# Patient Record
Sex: MALE | Race: OTHER | Employment: FULL TIME | URBAN - METROPOLITAN AREA
[De-identification: names, ages, dates, MRNs, and addresses within clinical notes are randomized per-mention and may not be internally consistent; named-entity substitution may affect disease eponyms.]

---

## 2017-06-12 ENCOUNTER — APPOINTMENT (OUTPATIENT)
Dept: CT IMAGING | Age: 42
End: 2017-06-12
Attending: EMERGENCY MEDICINE
Payer: SUBSIDIZED

## 2017-06-12 ENCOUNTER — APPOINTMENT (OUTPATIENT)
Dept: GENERAL RADIOLOGY | Age: 42
End: 2017-06-12
Attending: EMERGENCY MEDICINE
Payer: SUBSIDIZED

## 2017-06-12 ENCOUNTER — HOSPITAL ENCOUNTER (EMERGENCY)
Age: 42
Discharge: HOME | End: 2017-06-13
Attending: EMERGENCY MEDICINE
Payer: SUBSIDIZED

## 2017-06-12 DIAGNOSIS — K29.90 GASTRITIS AND DUODENITIS: ICD-10-CM

## 2017-06-12 DIAGNOSIS — R10.13 ABDOMINAL PAIN, EPIGASTRIC: Primary | ICD-10-CM

## 2017-06-12 LAB
ALBUMIN SERPL BCP-MCNC: 3.9 G/DL (ref 3.4–5)
ALBUMIN/GLOB SERPL: 1.1 {RATIO} (ref 0.8–1.7)
ALP SERPL-CCNC: 76 U/L (ref 45–117)
ALT SERPL-CCNC: 28 U/L (ref 16–61)
ANION GAP BLD CALC-SCNC: 7 MMOL/L (ref 3–18)
APPEARANCE UR: CLEAR
AST SERPL W P-5'-P-CCNC: 14 U/L (ref 15–37)
BASOPHILS # BLD AUTO: 0 K/UL (ref 0–0.1)
BASOPHILS # BLD: 0 % (ref 0–2)
BILIRUB SERPL-MCNC: 0.8 MG/DL (ref 0.2–1)
BILIRUB UR QL: NEGATIVE
BUN SERPL-MCNC: 13 MG/DL (ref 7–18)
BUN/CREAT SERPL: 12 (ref 12–20)
CALCIUM SERPL-MCNC: 8.7 MG/DL (ref 8.5–10.1)
CHLORIDE SERPL-SCNC: 102 MMOL/L (ref 100–108)
CO2 SERPL-SCNC: 29 MMOL/L (ref 21–32)
COLOR UR: YELLOW
CREAT SERPL-MCNC: 1.06 MG/DL (ref 0.6–1.3)
DIFFERENTIAL METHOD BLD: ABNORMAL
EOSINOPHIL # BLD: 0.1 K/UL (ref 0–0.4)
EOSINOPHIL NFR BLD: 1 % (ref 0–5)
ERYTHROCYTE [DISTWIDTH] IN BLOOD BY AUTOMATED COUNT: 13.5 % (ref 11.6–14.5)
GLOBULIN SER CALC-MCNC: 3.7 G/DL (ref 2–4)
GLUCOSE SERPL-MCNC: 114 MG/DL (ref 74–99)
GLUCOSE UR STRIP.AUTO-MCNC: NEGATIVE MG/DL
HCT VFR BLD AUTO: 47.6 % (ref 36–48)
HGB BLD-MCNC: 16.6 G/DL (ref 13–16)
HGB UR QL STRIP: NEGATIVE
KETONES UR QL STRIP.AUTO: NEGATIVE MG/DL
LACTATE BLD-SCNC: 0.7 MMOL/L (ref 0.4–2)
LEUKOCYTE ESTERASE UR QL STRIP.AUTO: NEGATIVE
LIPASE SERPL-CCNC: 197 U/L (ref 73–393)
LYMPHOCYTES # BLD AUTO: 20 % (ref 21–52)
LYMPHOCYTES # BLD: 2.1 K/UL (ref 0.9–3.6)
MCH RBC QN AUTO: 30.6 PG (ref 24–34)
MCHC RBC AUTO-ENTMCNC: 34.9 G/DL (ref 31–37)
MCV RBC AUTO: 87.7 FL (ref 74–97)
MONOCYTES # BLD: 0.9 K/UL (ref 0.05–1.2)
MONOCYTES NFR BLD AUTO: 9 % (ref 3–10)
NEUTS SEG # BLD: 7.3 K/UL (ref 1.8–8)
NEUTS SEG NFR BLD AUTO: 70 % (ref 40–73)
NITRITE UR QL STRIP.AUTO: NEGATIVE
PH UR STRIP: 7 [PH] (ref 5–8)
PLATELET # BLD AUTO: 260 K/UL (ref 135–420)
PMV BLD AUTO: 9.4 FL (ref 9.2–11.8)
POTASSIUM SERPL-SCNC: 3.5 MMOL/L (ref 3.5–5.5)
PROT SERPL-MCNC: 7.6 G/DL (ref 6.4–8.2)
PROT UR STRIP-MCNC: NEGATIVE MG/DL
RBC # BLD AUTO: 5.43 M/UL (ref 4.7–5.5)
SODIUM SERPL-SCNC: 138 MMOL/L (ref 136–145)
SP GR UR REFRACTOMETRY: 1.02 (ref 1–1.03)
TSH SERPL DL<=0.05 MIU/L-ACNC: 1.99 UIU/ML (ref 0.36–3.74)
UROBILINOGEN UR QL STRIP.AUTO: 0.2 EU/DL (ref 0.2–1)
WBC # BLD AUTO: 10.4 K/UL (ref 4.6–13.2)

## 2017-06-12 PROCEDURE — 83690 ASSAY OF LIPASE: CPT | Performed by: EMERGENCY MEDICINE

## 2017-06-12 PROCEDURE — 85025 COMPLETE CBC W/AUTO DIFF WBC: CPT | Performed by: EMERGENCY MEDICINE

## 2017-06-12 PROCEDURE — 96374 THER/PROPH/DIAG INJ IV PUSH: CPT

## 2017-06-12 PROCEDURE — 80053 COMPREHEN METABOLIC PANEL: CPT | Performed by: EMERGENCY MEDICINE

## 2017-06-12 PROCEDURE — 81003 URINALYSIS AUTO W/O SCOPE: CPT | Performed by: EMERGENCY MEDICINE

## 2017-06-12 PROCEDURE — 74011636320 HC RX REV CODE- 636/320: Performed by: EMERGENCY MEDICINE

## 2017-06-12 PROCEDURE — 74011250636 HC RX REV CODE- 250/636: Performed by: EMERGENCY MEDICINE

## 2017-06-12 PROCEDURE — 83605 ASSAY OF LACTIC ACID: CPT

## 2017-06-12 PROCEDURE — 84443 ASSAY THYROID STIM HORMONE: CPT | Performed by: EMERGENCY MEDICINE

## 2017-06-12 PROCEDURE — 74177 CT ABD & PELVIS W/CONTRAST: CPT

## 2017-06-12 PROCEDURE — 74020 XR ABD (AP AND ERECT OR DECUB): CPT

## 2017-06-12 PROCEDURE — 74011250637 HC RX REV CODE- 250/637: Performed by: EMERGENCY MEDICINE

## 2017-06-12 PROCEDURE — 99284 EMERGENCY DEPT VISIT MOD MDM: CPT

## 2017-06-12 RX ORDER — OMEPRAZOLE 20 MG/1
20 CAPSULE, DELAYED RELEASE ORAL DAILY
Qty: 14 CAP | Refills: 0 | Status: SHIPPED | OUTPATIENT
Start: 2017-06-12 | End: 2017-06-26

## 2017-06-12 RX ORDER — ONDANSETRON 2 MG/ML
4 INJECTION INTRAMUSCULAR; INTRAVENOUS
Status: COMPLETED | OUTPATIENT
Start: 2017-06-12 | End: 2017-06-12

## 2017-06-12 RX ORDER — PANTOPRAZOLE SODIUM 40 MG/1
40 TABLET, DELAYED RELEASE ORAL
Status: COMPLETED | OUTPATIENT
Start: 2017-06-12 | End: 2017-06-12

## 2017-06-12 RX ORDER — ONDANSETRON 8 MG/1
8 TABLET, ORALLY DISINTEGRATING ORAL
Status: DISCONTINUED | OUTPATIENT
Start: 2017-06-12 | End: 2017-06-13 | Stop reason: HOSPADM

## 2017-06-12 RX ADMIN — ONDANSETRON 4 MG: 2 INJECTION INTRAMUSCULAR; INTRAVENOUS at 20:07

## 2017-06-12 RX ADMIN — ONDANSETRON 8 MG: 8 TABLET, ORALLY DISINTEGRATING ORAL at 21:33

## 2017-06-12 RX ADMIN — IOPAMIDOL 100 ML: 612 INJECTION, SOLUTION INTRAVENOUS at 22:10

## 2017-06-12 RX ADMIN — PANTOPRAZOLE SODIUM 40 MG: 40 TABLET, DELAYED RELEASE ORAL at 21:33

## 2017-06-12 NOTE — LETTER
NOTIFICATION RETURN TO WORK / SCHOOL 
 
6/12/2017 11:09 PM 
 
Mr. Annamarie Gamez [address] [place], [place] [id number] To Whom It May Concern: 
 
Annamarie Gamez is currently under the care of [facility] EMERGENCY DEPT. He will return to work/school on: 06/14/17 If there are questions or concerns please have the patient contact our office. Sincerely, Angel Emerson MD

## 2017-06-12 NOTE — ED NOTES
Pt sitting on side of bed in comfortable position. Pt able to ambulate to restroom to provide urine sample. States that he has had N/V x days. PT states he is only able to eat small amount and it causes abdominal pain. States that he went to work today and wasn't able to continue working because of pains and increased sweating. Will continue to monitor.

## 2017-06-12 NOTE — ED NOTES
I performed a brief evaluation, including history and physical, of the patient here in triage and I have determined that pt will need further treatment and evaluation from the FT provider. I have placed initial orders to help in expediting patients care.      June 12, 2017 at 6:53 PM - Erick Kaba MD        Visit Vitals    /83 (BP 1 Location: Left arm, BP Patient Position: At rest)    Pulse 77    Temp 98.8 °F (37.1 °C)    Resp 19    Wt 75 kg (165 lb 5.5 oz)    SpO2 97%

## 2017-06-13 VITALS
SYSTOLIC BLOOD PRESSURE: 123 MMHG | RESPIRATION RATE: 16 BRPM | WEIGHT: 165.34 LBS | DIASTOLIC BLOOD PRESSURE: 80 MMHG | OXYGEN SATURATION: 98 % | TEMPERATURE: 98.7 F | HEART RATE: 73 BPM

## 2017-06-13 NOTE — ED NOTES
I have reviewed discharge instructions with the patient. The patient verbalized understanding. Patient looks comfortable, left ED in stable condition. Patient ambulatory, steady gait noted. Vital signs stable upon discharge. No acute distress noted, no new complaints noted at this time. Patient armband removed and given to patient to take home. Patient was informed of the privacy risks if armband lost or stolen.

## 2017-06-13 NOTE — DISCHARGE INSTRUCTIONS
Dolor abdominal: Instrucciones de cuidado - [ Abdominal Pain: Care Instructions ]  Instrucciones de cuidado    El dolor abdominal tiene muchas causas posibles. Algunas de ellas no son graves y mejoran por sí solas en unos días. Otras requieren [place] y [place]. Si abarca dolor continúa o [place], necesitará el nueva revisión y [place] pruebas para determinar qué pasa. Es posible que necesite cirugía para corregir el problema. No ignore nuevos síntomas, garret fiebre, náuseas y [place], [address] [facility], dolor que [place] o [place]. Podrían ser señales de un problema más grave. Abarca médico puede haberle recomendado el consulta de [employer] las 8 o 12 horas siguientes. Si no se siente mejor, es posible que requiera [place] o [place]. El médico lo donaldson revisado minuciosamente, anitra puede emerald problemas más tarde. Si nota algún problema o síntomas nuevos, busque tratamiento médico inmediatamente. La atención de seguimiento es el parte clave de abarca tratamiento y seguridad. Asegúrese de hacer y acudir a todas las citas, y llame a abarca médico si está teniendo problemas. También es el buena idea saber los resultados de los exámenes y mantener el lista de los medicamentos que peter. ¿Cómo puede cuidarse en el hogar? · Descanse hasta que se sienta mejor. · Para prevenir la deshidratación, nya abundantes líquidos, suficientes para que abarca orina sea de color amarillo mg o transparente garret el agua. Elija beber agua y otros líquidos daisha sin cafeína hasta que se sienta mejor. Si tiene [employer], del corazón o del hígado y tiene que Tricia's líquidos, hable con abarca médico antes de aumentar abarca consumo. · Si tiene [employer], coma alimentos suaves, garret arroz, pan jayson seco o galletas saladas, bananas (plátanos) y puré de Synchari. Trate de comer varias comidas pequeñas al día en lugar de dos o arvind grandes.   · Espere hasta 50 horas después de que [employer] síntomas hayan desaparecido antes de comer alimentos condimentados, alcohol y bebidas que contengan cafeína. · No consuma alimentos ricos en grasa. · Evite medicamentos antiinflamatorios garret aspirina, ibuprofeno (Advil, Motrin) y naproxeno (Aleve). Pueden causar [employer]. Dígale a abarca médico si está tomando aspirina diariamente debido a otro problema de david. ¿Cuándo debe pedir ayuda? Llame al 911 en cualquier momento que considere que necesita atención de emergencia. Por ejemplo, llame si:  · Se desmayó (perdió el conocimiento). · Las heces son de color rojizo o muy sanguinolentas (con lottie). · Vomita lottie o algo parecido a granos de café molido. · Tiene dolor abdominal nuevo e intenso. Llame a abarca médico ahora mismo o busque atención médica inmediata si:  · Abarca dolor empeora, sobre todo si se concentra en el tomi parte del vientre. · Vuelve a tener fiebre o tiene fiebre más marco. · Vera heces son negruzcas y parecidas al alquitrán o tienen rastros de [place]. · Tiene sangrado vaginal inesperado. · Tiene síntomas de le infección del tracto urinario. Estos podrían incluir:  ¨ Dolor al [place]. ¨ Orinar con más frecuencia que lo habitual.  ¨ Lottie en la [place]. · Siente mareos o aturdimiento, o que está a punto de [place]. Preste especial atención a los cambios en abarca david y asegúrese de comunicarse con abarca médico si:  · No está mejorando después de 1 día (24 horas). ¿Dónde puede encontrar más información en inglés? Dilip Haq a http://diandra-cristo.info/. Jilda Burkitt D394 en la búsqueda para aprender más acerca de \"Dolor abdominal: Instrucciones de cuidado - [ Abdominal Pain: Care Instructions ]. \"  Revisado: 27 [place], 2016  Versión del contenido: 11.2  © 9478-9360 [employer], [employer]. Las instrucciones de cuidado fueron adaptadas bajo licencia por [employer] (which disclaims liability or warranty for this information).  Si usted tiene [place] afección médica o sobre estas instrucciones, siempre pregunte a abarca profesional de david. [facility] niega toda garantía o responsabilidad por abarca uso de esta información. Gastritis: Instrucciones de cuidado - [ Gastritis: Care Instructions ]  Instrucciones de cuidado    La gastritis es dolor y malestar estomacal. Sucede cuando algo irrita el revestimiento del estómago. Hay muchas cosas que pueden causarla. Entre estas se incluyen el infección garret la gripe o algo que ha comido o bebido. Los medicamentos o el llaga en el recubrimiento del estómago ([place]) también pueden causarla. Puede tener abotagamiento y dolor abdominal. Podría eructar, vomitar y tener revoltura estomacal.  Usted debería poder aliviar el problema tomando medicamentos. Y riaz vez sería útil cambiar la alimentación. Si la gastritis continúa, abarca médico podría recetarle medicamentos. La atención de seguimiento es el parte clave de abarca tratamiento y seguridad. Asegúrese de hacer y acudir a todas las citas, y llame a abarca médico si está teniendo problemas. También es el buena idea saber los resultados de los exámenes y mantener el lista de los medicamentos que peter. ¿Cómo puede cuidarse en el hogar? · Si abarca médico le recetó antibióticos, tómelos según las indicaciones. No deje de tomarlos por el hecho de sentirse mejor. Debe zhang todos los antibióticos hasta terminarlos. · Sea swati con los medicamentos. Si abarca médico le recetó un medicamento para reducir el ácido estomacal, tómelo según las indicaciones. Llame a abarca médico si mahsa estar teniendo problemas con abarca medicamento. · No tome ningún otro medicamento, incluyendo analgésicos (medicamentos para el dolor) de venta bel, sin consultar con abarca médico michael. · Si abarca médico le recomienda medicamentos de venta bel para reducir el ácido estomacal, tales garret Pepcid AC, Prilosec, Tagamet HB o Zantac 75, siga las instrucciones de la etiqueta.   · Pascale abundantes líquidos (los suficientes garret para que abarca orina sea de color amarillo mg o transparente garret el agua) para prevenir la deshidratación. Elija zhang agua y otros líquidos daisha sin cafeína. Si tiene [employer] [facility], el corazón o el hígado y tiene que Tricia's líquidos, hable con abarca médico antes de aumentar abarca consumo. · Limite la cantidad de alcohol que doretha. · Evite el café, el té, las bebidas de cola, el chocolate y otros alimentos que contengan cafeína. Aumentan el ácido estomacal.  ¿Cuándo debe pedir ayuda? Llame al 911 en cualquier momento que considere que necesita atención de [place]. Por ejemplo, llame si:  · Vomita lottie o algo parecido a granos de café molido. · Vera heces son de color rojizo o muy sanguinolentas (con lottie). Llame a abarca médico ahora mismo o busque atención médica inmediata si:  · Empieza a respirar en forma acelerada y no ha producido [place] en las últimas 8 horas. · No puede retener líquidos en el estómago. Preste especial atención a los cambios en abarca david y asegúrese de comunicarse con abarca médico si:  · No mejora garret se esperaba. ¿Dónde puede encontrar más información en inglés? Emerson Beltrán a http://diandra-cristo.info/. Yolanda Toure Q639 en la búsqueda para aprender más acerca de \"Gastritis: Instrucciones de cuidado - [ Gastritis: Care Instructions ]. \"  Revisado: 9 agosto, 2016  Versión del contenido: 11.2  © 9259-2240 [employer]. Las instrucciones de cuidado fueron adaptadas bajo licencia por [employer] (which disclaims liability or warranty for this information). Si usted tiene [place] afección médica o sobre estas instrucciones, siempre pregunte a abarca profesional de david. [facility] niega toda garantía o responsabilidad por abarca uso de esta información.

## 2017-06-13 NOTE — ED PROVIDER NOTES
HPI Comments: 8:44 PM Tristin Hutchinson is a 46 y.o. male who presents to the ED c/o epigastric, LLQ, and LUQ abdominal pain X 11 PM on 06/09/17. Pt states that he ate some fajitas from a [employer] the night his abdomen began to hurt. He mentions that no one else was sick and he does not think that the food aggravated his abdomen. He states that he tried to go to work the next day and he became extremely nauseous. He has been unable to work for the past 3 days. He also c/o diaphoresis, subjective fever, and decrease appetite change. He states that he was been unable to eat broth or soup since last Friday. He has tried taking laxatives and Peptobismol, but they have provided minimal relief. He denies diarrhea, difficulty urinating, any abdominal surgeries, and BM complications. No other associated symptoms or modifying factors at this time. The history is provided by the patient. History reviewed. No pertinent past medical history. History reviewed. No pertinent surgical history. History reviewed. No pertinent family history. Social History     Social History    Marital status: SINGLE     Spouse name: N/A    Number of children: N/A    Years of education: N/A     Occupational History    Not on file. Social History Main Topics    Smoking status: Current Every Day Smoker    Smokeless tobacco: Not on file    Alcohol use Yes    Drug use: No    Sexual activity: Not on file     Other Topics Concern    Not on file     Social History Narrative    No narrative on file         ALLERGIES: Review of patient's allergies indicates no known allergies. Review of Systems   Constitutional: Positive for appetite change (decreased), diaphoresis and fever (subjective). Negative for activity change. HENT: Negative for congestion, dental problem, ear pain, hearing loss, nosebleeds, postnasal drip, sinus pressure, sneezing and tinnitus.     Eyes: Negative for photophobia, discharge, redness and visual disturbance. Respiratory: Negative for choking, shortness of breath, wheezing and stridor. Cardiovascular: Negative for palpitations and leg swelling. Gastrointestinal: Positive for nausea. Negative for abdominal distention, anal bleeding, blood in stool and diarrhea. Genitourinary: Negative for decreased urine volume, difficulty urinating, discharge, penile swelling, scrotal swelling and urgency. Musculoskeletal: Negative for gait problem, joint swelling and neck pain. Skin: Negative for color change and pallor. Neurological: Negative for dizziness, tremors, seizures and syncope. Hematological: Negative for adenopathy. Does not bruise/bleed easily. Psychiatric/Behavioral: Negative for agitation, behavioral problems, confusion and hallucinations. The patient is not nervous/anxious. All other systems reviewed and are negative. Vitals:    06/12/17 1848   BP: 125/83   Pulse: 77   Resp: 19   Temp: 98.8 °F (37.1 °C)   SpO2: 97%   Weight: 75 kg (165 lb 5.5 oz)            Physical Exam   Constitutional: He is oriented to person, place, and time. He appears well-developed and well-nourished. HENT:   Head: Normocephalic and atraumatic. Right Ear: External ear normal.   Left Ear: External ear normal.   Nose: Nose normal.   Mouth/Throat: Oropharynx is clear and moist.   Eyes: Conjunctivae and EOM are normal. Pupils are equal, round, and reactive to light. Right eye exhibits no discharge. No scleral icterus. Neck: Normal range of motion. Neck supple. No JVD present. No thyromegaly present. Cardiovascular: Normal rate, regular rhythm and intact distal pulses. Exam reveals no gallop and no friction rub. No murmur heard. Pulmonary/Chest: No respiratory distress. He has no wheezes. He has no rales. He exhibits no tenderness. Abdominal: He exhibits no distension and no mass. There is tenderness in the epigastric area, left upper quadrant and left lower quadrant.  There is no rebound and no guarding. Genitourinary: Rectal exam shows guaiac negative stool. Genitourinary Comments: Brown stools noted   Musculoskeletal: Normal range of motion. He exhibits no edema. Lymphadenopathy:     He has no cervical adenopathy. Neurological: He is alert and oriented to person, place, and time. No cranial nerve deficit. Coordination normal.   Skin: Skin is warm and dry. No rash noted. No erythema. Psychiatric: He has a normal mood and affect. His behavior is normal. Judgment normal.   Nursing note and vitals reviewed. MDM  Number of Diagnoses or Management Options  Diagnosis management comments: Abdominal pain, epigastric. Differential diagnosis includes peptic ulcer disease, pancreatitis, hepatic disease.  Aortic disease, medication effect, other       Amount and/or Complexity of Data Reviewed  Clinical lab tests: ordered  Tests in the radiology section of CPT®: ordered    Risk of Complications, Morbidity, and/or Mortality  Presenting problems: moderate      ED Course       Procedures  Vitals:  Patient Vitals for the past 12 hrs:   Temp Pulse Resp BP SpO2   06/12/17 1848 98.8 °F (37.1 °C) 77 19 125/83 97 %       Medications ordered:   Medications   ondansetron (ZOFRAN ODT) tablet 8 mg (8 mg Oral Given 6/12/17 2133)   ondansetron (ZOFRAN) injection 4 mg (4 mg IntraVENous Given 6/12/17 2007)   pantoprazole (PROTONIX) tablet 40 mg (40 mg Oral Given 6/12/17 2133)   iopamidol (ISOVUE 300) 61 % contrast injection 100 mL (100 mL IntraVENous Given 6/12/17 2210)         Lab findings:  Recent Results (from the past 12 hour(s))   LIPASE    Collection Time: 06/12/17  7:52 PM   Result Value Ref Range    Lipase 197 73 - 393 U/L   URINALYSIS W/ RFLX MICROSCOPIC    Collection Time: 06/12/17  7:52 PM   Result Value Ref Range    Color YELLOW      Appearance CLEAR      Specific gravity 1.018 1.005 - 1.030      pH (UA) 7.0 5.0 - 8.0      Protein NEGATIVE  NEG mg/dL    Glucose NEGATIVE  NEG mg/dL    Ketone NEGATIVE  NEG mg/dL    Bilirubin NEGATIVE  NEG      Blood NEGATIVE  NEG      Urobilinogen 0.2 0.2 - 1.0 EU/dL    Nitrites NEGATIVE  NEG      Leukocyte Esterase NEGATIVE  NEG     CBC WITH AUTOMATED DIFF    Collection Time: 06/12/17  7:52 PM   Result Value Ref Range    WBC 10.4 4.6 - 13.2 K/uL    RBC 5.43 4.70 - 5.50 M/uL    HGB 16.6 (H) 13.0 - 16.0 g/dL    HCT 47.6 36.0 - 48.0 %    MCV 87.7 74.0 - 97.0 FL    MCH 30.6 24.0 - 34.0 PG    MCHC 34.9 31.0 - 37.0 g/dL    RDW 13.5 11.6 - 14.5 %    PLATELET 742 184 - 906 K/uL    MPV 9.4 9.2 - 11.8 FL    NEUTROPHILS 70 40 - 73 %    LYMPHOCYTES 20 (L) 21 - 52 %    MONOCYTES 9 3 - 10 %    EOSINOPHILS 1 0 - 5 %    BASOPHILS 0 0 - 2 %    ABS. NEUTROPHILS 7.3 1.8 - 8.0 K/UL    ABS. LYMPHOCYTES 2.1 0.9 - 3.6 K/UL    ABS. MONOCYTES 0.9 0.05 - 1.2 K/UL    ABS. EOSINOPHILS 0.1 0.0 - 0.4 K/UL    ABS. BASOPHILS 0.0 0.0 - 0.1 K/UL    DF AUTOMATED     METABOLIC PANEL, COMPREHENSIVE    Collection Time: 06/12/17  7:52 PM   Result Value Ref Range    Sodium 138 136 - 145 mmol/L    Potassium 3.5 3.5 - 5.5 mmol/L    Chloride 102 100 - 108 mmol/L    CO2 29 21 - 32 mmol/L    Anion gap 7 3.0 - 18 mmol/L    Glucose 114 (H) 74 - 99 mg/dL    BUN 13 7.0 - 18 MG/DL    Creatinine 1.06 0.6 - 1.3 MG/DL    BUN/Creatinine ratio 12 12 - 20      GFR est AA >60 >60 ml/min/1.73m2    GFR est non-AA >60 >60 ml/min/1.73m2    Calcium 8.7 8.5 - 10.1 MG/DL    Bilirubin, total 0.8 0.2 - 1.0 MG/DL    ALT (SGPT) 28 16 - 61 U/L    AST (SGOT) 14 (L) 15 - 37 U/L    Alk.  phosphatase 76 45 - 117 U/L    Protein, total 7.6 6.4 - 8.2 g/dL    Albumin 3.9 3.4 - 5.0 g/dL    Globulin 3.7 2.0 - 4.0 g/dL    A-G Ratio 1.1 0.8 - 1.7     TSH 3RD GENERATION    Collection Time: 06/12/17  7:52 PM   Result Value Ref Range    TSH 1.99 0.36 - 3.74 uIU/mL   POC LACTIC ACID    Collection Time: 06/12/17  8:10 PM   Result Value Ref Range    Lactic Acid (POC) 0.7 0.4 - 2.0 mmol/L       X-Ray, CT or other radiology findings or impressions:  XR ABD (AP AND ERECT OR DECUB)      IMPRESSION:  Stool noted. Per Carin Bloch, MD 9:04 PM    IMPRESSION:  1. Nonobstructive abdomen. 2. Contrast opacification of some contents in the colon. Per Radiology      CT ABD PELV W CONT  IMPRESSION:  Nonspecific fluid within the duodenal C-loop. This may be within physiologic  limits of normal. In the setting of epigastric pain, finding may represent a  mild duodenitis.     No other definite finding for an acute process in the abdomen or pelvis.     Short segment narrowing of the rectosigmoid colon likely contraction, stricture  felt less likely. Not optimally evaluated due to underdistention.     Probable small cyst at the dome of the liver and the inferior margin of the  spleen. Reevaluation of patient:   9:55 PM Patient states that he is still having significant epigastric discomfort. Will order CT scan. 11:10 PM I have reassessed the patient. Patient is feeling okay. Patient was discharged. He was instructed to take prescribed medication and to follow a clear liquid diet for the next 24 hours. While it is impossible to completely exclude the possibility of underlying serious disease or worsening of condition, I feel the relative likelihood is extremely low. I discussed this uncertainty with the patient, who understood ED evaluation and treatment and felt comfortable with the outpatient treatment plan. All questions regarding care, test results, and follow up were answered. The patient is stable and appropriate to discharge. They understand that they should return to the emergency department for any new or worsening symptoms. I stressed the importance of follow up for repeat assessment and possibly further evaluation/treatment. Disposition:  Diagnosis:   1. Abdominal pain, epigastric    2.  Gastritis and duodenitis        Disposition: Discharged    Follow-up Information     None           Patient's Medications   Start Taking    OMEPRAZOLE (PRILOSEC) 20 MG CAPSULE    Take 1 Cap by mouth daily for 14 days. Continue Taking    No medications on file   These Medications have changed    No medications on file   Stop Taking    No medications on file       SCRIBE ATTESTATION STATEMENT  Documented by: Mirza Lee for, and in the presence of, Angel Emerson MD 8:42 PM    Signed by: Mauro Wheeler, 06/12/17 8:42 PM    PROVIDER ATTESTATION STATEMENT  I personally performed the services described in the documentation, reviewed the documentation, as recorded by the scribe in my presence, and it accurately and completely records my words and actions.   Angel Emerson MD

## 2017-06-19 ENCOUNTER — HOSPITAL ENCOUNTER (EMERGENCY)
Age: 42
Discharge: HOME | End: 2017-06-19
Attending: EMERGENCY MEDICINE
Payer: OTHER MISCELLANEOUS

## 2017-06-19 VITALS
RESPIRATION RATE: 16 BRPM | DIASTOLIC BLOOD PRESSURE: 70 MMHG | HEIGHT: 68 IN | BODY MASS INDEX: 26.37 KG/M2 | TEMPERATURE: 98.5 F | HEART RATE: 77 BPM | WEIGHT: 174 LBS | SYSTOLIC BLOOD PRESSURE: 118 MMHG | OXYGEN SATURATION: 99 %

## 2017-06-19 DIAGNOSIS — S81.811A LACERATION OF LEG, RIGHT, INITIAL ENCOUNTER: Primary | ICD-10-CM

## 2017-06-19 PROCEDURE — 74011250636 HC RX REV CODE- 250/636: Performed by: PHYSICIAN ASSISTANT

## 2017-06-19 PROCEDURE — 77030018836 HC SOL IRR NACL ICUM -A

## 2017-06-19 PROCEDURE — 77030031132 HC SUT NYL COVD -A

## 2017-06-19 PROCEDURE — 99282 EMERGENCY DEPT VISIT SF MDM: CPT

## 2017-06-19 PROCEDURE — 75810000293 HC SIMP/SUPERF WND  RPR

## 2017-06-19 PROCEDURE — 90471 IMMUNIZATION ADMIN: CPT

## 2017-06-19 PROCEDURE — 90715 TDAP VACCINE 7 YRS/> IM: CPT | Performed by: PHYSICIAN ASSISTANT

## 2017-06-19 RX ORDER — BACITRACIN 500 [USP'U]/G
OINTMENT TOPICAL 3 TIMES DAILY
Qty: 1 TUBE | Refills: 0 | Status: SHIPPED | OUTPATIENT
Start: 2017-06-19 | End: 2017-06-29

## 2017-06-19 RX ADMIN — TETANUS TOXOID, REDUCED DIPHTHERIA TOXOID AND ACELLULAR PERTUSSIS VACCINE, ADSORBED 0.5 ML: 5; 2.5; 8; 8; 2.5 SUSPENSION INTRAMUSCULAR at 02:53

## 2017-06-19 NOTE — ED PROVIDER NOTES
HPI Comments: 46 yr old male, no known PMH, presents to the ED after sustaining a laceration to the side of the R leg PTA. Pt states he cut the leg on a glass bottle. Unknown last tetanus. No other complaints. Patient is a 46 y.o. male presenting with skin laceration. Laceration    Pertinent negatives include no numbness and no weakness. History reviewed. No pertinent past medical history. History reviewed. No pertinent surgical history. History reviewed. No pertinent family history. Social History     Social History    Marital status: SINGLE     Spouse name: N/A    Number of children: N/A    Years of education: N/A     Occupational History    Not on file. Social History Main Topics    Smoking status: Current Every Day Smoker    Smokeless tobacco: Not on file    Alcohol use Yes    Drug use: No    Sexual activity: Not on file     Other Topics Concern    Not on file     Social History Narrative         ALLERGIES: Review of patient's allergies indicates no known allergies. Review of Systems   Constitutional: Negative. Negative for chills, diaphoresis, fatigue and fever. HENT: Negative. Negative for congestion, ear pain, rhinorrhea and sore throat. Eyes: Negative. Negative for pain, redness and visual disturbance. Respiratory: Negative. Negative for cough, shortness of breath, wheezing and stridor. Cardiovascular: Negative. Negative for chest pain, palpitations and leg swelling. Gastrointestinal: Negative. Negative for abdominal pain, constipation, diarrhea, nausea and vomiting. Endocrine: Negative. Genitourinary: Negative. Negative for dysuria, flank pain, frequency and hematuria. Musculoskeletal: Negative. Negative for back pain, myalgias, neck pain and neck stiffness. Skin: Positive for wound. Negative for rash. Allergic/Immunologic: Negative. Neurological: Negative.   Negative for dizziness, seizures, syncope, weakness, light-headedness, numbness and headaches. Hematological: Negative. Psychiatric/Behavioral: Negative. All other systems reviewed and are negative. Vitals:    06/19/17 0122   BP: 120/69   Pulse: 94   Resp: 18   Temp: 98.6 °F (37 °C)   SpO2: 98%   Weight: 78.9 kg (174 lb)   Height: 5' 8\" (1.727 m)            Physical Exam   Constitutional: He is oriented to person, place, and time. He appears well-developed and well-nourished. No distress. HENT:   Head: Normocephalic. Neck: Normal range of motion. Neck supple. Cardiovascular: Normal rate, regular rhythm and normal heart sounds. Exam reveals no gallop and no friction rub. No murmur heard. Pulmonary/Chest: Effort normal and breath sounds normal. No stridor. No respiratory distress. He has no wheezes. He has no rales. Musculoskeletal: Normal range of motion. He exhibits no edema, tenderness or deformity. Neurological: He is alert and oriented to person, place, and time. Coordination normal.   Skin: Skin is warm and dry. No rash noted. He is not diaphoretic. 3 cm laceration into the dermis noted to the lateral aspect of the R calf, bleeding controlled   Psychiatric: He has a normal mood and affect. His behavior is normal. Thought content normal.   Nursing note and vitals reviewed. MDM  Number of Diagnoses or Management Options  Diagnosis management comments: Impression:  Laceration    Tetanus given, wound sutured    Patient is stable for discharge at this time. Rx for bacitracin given. Rest and follow-up with PCP this week. Return to the ED immediately for any new or worsening sx.   Jacinda Barrett PA-C 2:45 AM     Risk of Complications, Morbidity, and/or Mortality  Presenting problems: low  Diagnostic procedures: low  Management options: low    Patient Progress  Patient progress: stable    ED Course       Wound Repair  Date/Time: 6/19/2017 2:45 AM  Performed by: PAPreparation: skin prepped with Betadine and sterile field established  Pre-procedure re-eval: Immediately prior to the procedure, the patient was reevaluated and found suitable for the planned procedure and any planned medications. Time out: Immediately prior to the procedure a time out was called to verify the correct patient, procedure, equipment, staff and marking as appropriate. .  Location details: right leg  Wound length:2.6 - 7.5 cm  Anesthesia: local infiltration    Anesthesia:  Anesthesia: local infiltration  Local Anesthetic: lidocaine 1% without epinephrine   Anesthetic total: 4 mL  Foreign bodies: no foreign bodies  Irrigation solution: saline  Irrigation method: syringe  Debridement: none  Skin closure: 5-0 nylon  Number of sutures: 7  Technique: simple  Approximation: close  Patient tolerance: Patient tolerated the procedure well with no immediate complications  My total time at bedside, performing this procedure was 1-15 minutes.

## 2017-06-19 NOTE — DISCHARGE INSTRUCTIONS
Laws: Instrucciones de cuidado - [ Cuts: Care Instructions ]  Instrucciones de cuidado  Un phil puede ocurrir en cualquier parte del cuerpo. A veces, se usan puntos de sutura, grapas, Adar IT para la piel o cintas [place] [address] llamadas Steri-Strips para mantener juntos los bordes de un phil y ayudar a que sane. Las cintas Steri-Strip pueden usarse por sí solas o junto con puntos de sutura o grapas. Otras veces, se kylee los laws abiertos. Si el phil es profundo y [employer], es posible que el médico haya cerrado el phil en dos capas. El capa más profunda de puntos de sutura junta la parte profunda del phil. Estos puntos se disuelven y no es necesario extraerlos. El cierre de la capa superior, que puede [place] por medio de puntos, Lazaro, Steri-Strips o Adar IT, es lo que se puede miguel angel sobre el phil. Con frecuencia, un phil se cubre con el venda. El médico lo ha examinado minuciosamente, anitra pueden presentarse problemas más tarde. Si nota algún problema o nuevos síntomas, busque tratamiento médico de inmediato. La atención de seguimiento es el parte clave de hidalgo tratamiento y seguridad. Asegúrese de hacer y acudir a todas las citas, y llame a hidalgo médico si está teniendo problemas. También es el buena idea saber los resultados de rodrigue exámenes y mantener el lista de los medicamentos que peter. ¿Cómo puede cuidarse en el hogar? Si un phil está abierto o cerrado  · Apoye la john afectada sobre el almohada en cualquier momento que esté sentado o acostado beth los 3 días siguientes. Trate de mantenerla por encima del nivel del corazón. [place] ayudará a reducir la hinchazón. · Mantenga la herida seca beth las primeras 24 a 48 horas. Después de [address], puede ducharse si el médico lo [place]. Seque el phil con toques suaves de toalla. · No remoje el phil, garret en el joaquin (bañera). Hidalgo médico le indicará cuándo es seguro mojar el phil.   · Después de las primeras 24 a 48 horas, limpie el phil con agua y jabón 2 veces al día, a menos que el médico le dé indicaciones diferentes. ¨ No use peróxido de hidrógeno (agua [place]) ni alcohol, los cuales pueden retrasar la sanación. ¨ Puede cubrir el phil con el capa delgada de vaselina y el venda antiadherente. ¨ Si el médico colocó el venda sobre el phil, colóquese el venda nueva después de limpiar el phil o si la venda se moja o se ensucia. · Evite cualquier actividad que pudiera hacer que el phil se florence de nuevo. · Sea swati con los medicamentos. Mica y siga todas las indicaciones de la [place]. ¨ Si el médico le recetó un analgésico (medicamento para el dolor), tómelo según las indicaciones. ¨ Si no está tomando un analgésico recetado, pregúntele a abarca médico si puede zhang kevin de [employer]. Si se cerró el phil con puntos, grapas o Steri-Strips  · Siga las instrucciones anteriores para los laws abiertos o cerrados. · No se quite los puntos o las grapas por sí mismo. El médico le indicará cuándo debe volver para que le quiten los [address] grapas. · Deje puestas las Steri-Strips hasta que se desprendan por sí solas. Si se cerró el phil con un adhesivo para la piel  · Siga las instrucciones anteriores para los laws abiertos o cerrados. · Déjese puesto el [employer] sobre la piel hasta que se desprenda por sí solo. [place] puede tardar entre 5 y 7 días. · No se rasque, frote ni hurgue el [employer]. · No se aplique la parte pegajosa de el venda directamente sobre el [employer]. · No se aplique ningún tipo de pomada, crema o loción sobre la john. [place] puede hacer que el [employer] se desprenda demasiado pronto. No use peróxido de hidrógeno (agua [place]) ni alcohol, los cuales pueden retrasar la sanación. ¿Cuándo debe pedir ayuda? Llame a abarca médico ahora mismo o busque atención médica inmediata si:  · Tiene dolor nuevo, o el dolor empeora. · La piel cerca del phil está fría o pálida, o cambia de color.   · Siente hormigueo, debilitamiento o entumecimiento cerca del phil. · El phil comienza a sangrar, y la lottie empapa la venda. Es normal que salga el pequeña cantidad de [place]. · Tiene dificultades para  la john cercana al phil. · Tiene síntomas de infección, tales garret:  ¨ Aumento del dolor, la hinchazón, la temperatura o el enrojecimiento alrededor del phil. ¨ Vetas rojizas que comienzan en el phil. ¨ Pus que sale del phil. Marina Fresh. Vigile de cerca los cambios en abarca david y asegúrese de comunicarse con abarca médico si:  · El phil vuelve a abrirse. · No mejora garret se esperaba. ¿Dónde puede encontrar más información en inglés? Kev Rosales a [email or web address]-cristo.info/. Brain Chambers M735 en la búsqueda para aprender más acerca de \"[place]: Instrucciones de cuidado - [ Cuts: Care Instructions ]. \"  Revisado: 27 moss, 2016  Versión del contenido: 11.2  © 2915-1910 Healthwise, Incorporated. Las instrucciones de cuidado fueron adaptadas bajo licencia por [employer] (which disclaims liability or warranty for this information). Si usted tiene [place] afección médica o sobre estas instrucciones, siempre pregunte a abarca profesional de david. Healthwise, Incorporated niega toda garantía o responsabilidad por abarca uso de esta información. [employer] Activation    Thank you for requesting access to [employer]. Please follow the instructions below to securely access and download your online medical record. [employer] allows you to send messages to your doctor, view your test results, renew your prescriptions, schedule appointments, and more. How Do I Sign Up? 1. In your internet browser, go to [email or web address] ([email or web address])  2. Click on the First Time User? Click Here link in the Sign In box. You will be redirect to the New Member Sign Up page. 3. Enter your [employer] Access Code exactly as it appears below. You will not need to use this code after youve completed the sign-up process.  If you do not sign up before the expiration date, you must request a new code. [employer] Access Code: [email or web address]  Expires: 9/10/2017 11:12 PM (This is the date your [employer] access code will )    4. Enter the last four digits of your Social Security Number (xxxx) and Date of Birth (mm/dd/yyyy) as indicated and click Submit. You will be taken to the next sign-up page. 5. Create a [employer] ID. This will be your [employer] login ID and cannot be changed, so think of one that is secure and easy to remember. 6. Create a [employer] password. You can change your password at any time. 7. Enter your Password Reset Question and Answer. This can be used at a later time if you forget your password. 8. Enter your e-mail address. You will receive e-mail notification when new information is available in [address]. 9. Click Sign Up. You can now view and download portions of your medical record. 10. Click the Download Summary menu link to download a portable copy of your medical information. Additional Information    If you have questions, please visit the Frequently Asked Questions section of the [employer] website at [email or web address] Remember, [employer] is NOT to be used for urgent needs. For medical emergencies, dial 911.

## 2017-06-19 NOTE — ROUTINE PROCESS
I have reviewed discharge instructions with the patient. The patient verbalized understanding. Patient armband removed and shredded. Pt. Ambulated independently out of ED. Friend present to drive him home.

## 2017-06-30 ENCOUNTER — HOSPITAL ENCOUNTER (EMERGENCY)
Age: 42
Discharge: HOME | End: 2017-06-30
Attending: EMERGENCY MEDICINE
Payer: SELF-PAY

## 2017-06-30 VITALS
DIASTOLIC BLOOD PRESSURE: 72 MMHG | TEMPERATURE: 98.6 F | HEART RATE: 99 BPM | RESPIRATION RATE: 16 BRPM | SYSTOLIC BLOOD PRESSURE: 114 MMHG | OXYGEN SATURATION: 96 %

## 2017-06-30 DIAGNOSIS — Z48.02 VISIT FOR SUTURE REMOVAL: Primary | ICD-10-CM

## 2017-06-30 PROCEDURE — 77030008027

## 2017-06-30 PROCEDURE — 75810000275 HC EMERGENCY DEPT VISIT NO LEVEL OF CARE

## 2017-06-30 NOTE — ED PROVIDER NOTES
HPI Comments: 5:21 PM Travis Child is a 46 y.o. male who presents to the ED for suture removal from the right leg that occurred 11 days ago. He reports that he was seen in the ED 11 days ago after cutting his leg on a broken glass bottle that was protruding from a garbage bag and had 7 suture placed in his right leg. He was told to follow up with [address] in 7 days for suture removal, but he tried calling them and was unable to get in, so he came back to the ED today. The wound has been healing well and he has not noticed any drainage from the wound or redness around the wound. He denies fever and any further complaints. The history is provided by the patient. No past medical history on file. No past surgical history on file. No family history on file. Social History     Social History    Marital status: SINGLE     Spouse name: N/A    Number of children: N/A    Years of education: N/A     Occupational History    Not on file. Social History Main Topics    Smoking status: Current Every Day Smoker    Smokeless tobacco: Not on file    Alcohol use Yes    Drug use: No    Sexual activity: Not on file     Other Topics Concern    Not on file     Social History Narrative         ALLERGIES: Review of patient's allergies indicates no known allergies. Review of Systems   Constitutional: Negative for chills and fever. HENT: Negative for congestion and sore throat. Respiratory: Negative for cough and shortness of breath. Cardiovascular: Negative for chest pain and leg swelling. Gastrointestinal: Negative for abdominal pain and nausea. Genitourinary: Negative for dysuria and hematuria. Musculoskeletal: Negative for back pain. Skin: Positive for wound (sutured laceration). Negative for rash. Neurological: Negative for syncope, light-headedness and headaches. Psychiatric/Behavioral: Negative for behavioral problems.  The patient is not nervous/anxious. Vitals:    06/30/17 1702   BP: 114/72   Pulse: 99   Resp: 16   Temp: 98.6 °F (37 °C)   SpO2: 96%            Physical Exam   Constitutional: He is oriented to person, place, and time. He appears well-developed and well-nourished. HENT:   Head: Normocephalic and atraumatic. Neck: Neck supple. No JVD present. Cardiovascular: Normal rate, regular rhythm and normal heart sounds. Pulmonary/Chest: Effort normal and breath sounds normal.   Musculoskeletal: He exhibits no edema. Neurological: He is alert and oriented to person, place, and time. Skin: Skin is warm and dry. No erythema. Psychiatric: Judgment normal.        MDM  ED Course       Suture/Staple Removal  Date/Time: 6/30/2017 5:27 PM  Performed by: Rod Loja by: Martine Mcduffie     Consent:     Consent obtained:  Verbal    Consent given by:  Patient    Risks discussed:  Bleeding, pain and wound separation  Location:     Location:  Lower extremity    Lower extremity location:  Leg    Leg location:  R lower leg  Procedure details:     Wound appearance:  No signs of infection    Number of sutures removed:  7  Post-procedure details:     Patient tolerance of procedure: Tolerated well, no immediate complications      Vitals:  Patient Vitals for the past 12 hrs:   Temp Pulse Resp BP SpO2   06/30/17 1702 98.6 °F (37 °C) 99 16 114/72 96 %     Pulse ox reviewed and WNL    Medications ordered:   Medications - No data to display      Progress notes, Consult notes or additional Procedure notes:   5:29 PM  Discussed findings, as well as, diagnosis and plan for discharge. Pt verbalizes understanding and agreement with plan. All questions addressed at this time. Disposition:  Diagnosis:   1. Visit for suture removal        Disposition: Discharge, return here for any concerns.      Follow-up Information     Follow up With Details Comments [address] In 2 days  [address] [address]  [phone]           Patient's Medications    No medications on file       [address] [facility]  Documented by: Gladis Maciel for, and in the presence of, Rohit Solis PA-C 5:29 PM     Signed by: Mauro Snyder, 06/30/17 5:29 PM    PROVIDER ATTESTATION STATEMENT  I personally performed the services described in the documentation, reviewed the documentation, as recorded by the scribe in my presence, and it accurately and completely records my words and actions.   Rohit Solis PA-C